# Patient Record
Sex: MALE | Race: WHITE | NOT HISPANIC OR LATINO | Employment: FULL TIME | URBAN - METROPOLITAN AREA
[De-identification: names, ages, dates, MRNs, and addresses within clinical notes are randomized per-mention and may not be internally consistent; named-entity substitution may affect disease eponyms.]

---

## 2017-12-20 ENCOUNTER — NON-PROVIDER VISIT (OUTPATIENT)
Dept: URGENT CARE | Facility: CLINIC | Age: 47
End: 2017-12-20

## 2017-12-20 ENCOUNTER — OFFICE VISIT (OUTPATIENT)
Dept: URGENT CARE | Facility: CLINIC | Age: 47
End: 2017-12-20

## 2017-12-20 DIAGNOSIS — Z01.89 RESPIRATORY CLEARANCE EXAMINATION, ENCOUNTER FOR: ICD-10-CM

## 2017-12-20 PROCEDURE — 8916 PR CLEARANCE ONLY: Performed by: NURSE PRACTITIONER

## 2017-12-20 PROCEDURE — 94375 RESPIRATORY FLOW VOLUME LOOP: CPT | Performed by: NURSE PRACTITIONER

## 2017-12-21 NOTE — PROGRESS NOTES
Corey Obregon is a 47 y.o. male here for a non-provider visit for Mask Fit/ Respiratory Clearance    If abnormal was an in office provider notified today (if so, indicate provider)? Yes  Routed to PCP? No

## 2018-07-13 ENCOUNTER — OFFICE VISIT (OUTPATIENT)
Dept: URGENT CARE | Facility: CLINIC | Age: 48
End: 2018-07-13
Payer: COMMERCIAL

## 2018-07-13 VITALS
BODY MASS INDEX: 29.77 KG/M2 | HEART RATE: 65 BPM | WEIGHT: 196.4 LBS | RESPIRATION RATE: 16 BRPM | TEMPERATURE: 97.8 F | SYSTOLIC BLOOD PRESSURE: 110 MMHG | HEIGHT: 68 IN | DIASTOLIC BLOOD PRESSURE: 86 MMHG | OXYGEN SATURATION: 95 %

## 2018-07-13 DIAGNOSIS — H10.13 ALLERGIC CONJUNCTIVITIS OF BOTH EYES: ICD-10-CM

## 2018-07-13 PROCEDURE — 99203 OFFICE O/P NEW LOW 30 MIN: CPT | Performed by: NURSE PRACTITIONER

## 2018-07-13 RX ORDER — OLOPATADINE HYDROCHLORIDE 1 MG/ML
1 SOLUTION/ DROPS OPHTHALMIC 2 TIMES DAILY
Qty: 5 ML | Refills: 0 | Status: SHIPPED | OUTPATIENT
Start: 2018-07-13 | End: 2018-07-13

## 2018-07-13 RX ORDER — PREDNISONE 10 MG/1
TABLET ORAL
Qty: 15 TAB | Refills: 0 | Status: SHIPPED | OUTPATIENT
Start: 2018-07-13 | End: 2018-09-02

## 2018-07-13 RX ORDER — LORATADINE 10 MG/1
10 TABLET ORAL DAILY
COMMUNITY

## 2018-07-13 ASSESSMENT — ENCOUNTER SYMPTOMS
RESPIRATORY NEGATIVE: 1
CONSTITUTIONAL NEGATIVE: 1
CARDIOVASCULAR NEGATIVE: 1
EYE REDNESS: 1
EYE DISCHARGE: 1

## 2018-07-13 NOTE — PROGRESS NOTES
Subjective:      Corey Obregon is a 47 y.o. male who presents with Eye Problem (bilateral eye redness/ irritation/ watery since yesterday )    History reviewed. No pertinent past medical history.  Social History     Social History   • Marital status: Single     Spouse name: N/A   • Number of children: N/A   • Years of education: N/A     Occupational History   • Not on file.     Social History Main Topics   • Smoking status: Never Smoker   • Smokeless tobacco: Never Used   • Alcohol use Not on file   • Drug use: Unknown   • Sexual activity: Not on file     Other Topics Concern   • Not on file     Social History Narrative   • No narrative on file     History reviewed. No pertinent family history.    Allergies: Patient has no known allergies.    Patient presents today with complaint of bilateral eye redness and itching with tearing. Symptoms started over the last week. States that he is working in a new area and thinks that he may be touching his face and this may be affecting his eyes. Patient has history of eczema and sensitivity to his skin. He currently has an outbreak of eczema to the posterior neck and the right antecubital area. He denies any upper respiratory symptoms.    Secondly, patient has itching behind his ears and has noted some cracking of the skin.          Eye Problem    Both eyes are affected.This is a new problem. The current episode started in the past 7 days. The problem occurs constantly. The problem has been unchanged. The pain is moderate. There is no known exposure to pink eye. He does not wear contacts. Associated symptoms include an eye discharge and eye redness. He has tried nothing for the symptoms. The treatment provided no relief.       Review of Systems   Constitutional: Negative.    HENT: Negative.    Eyes: Positive for discharge and redness.   Respiratory: Negative.    Cardiovascular: Negative.    Skin: Negative.    All other systems reviewed and are negative.         Objective:     BP  "110/86   Pulse 65   Temp 36.6 °C (97.8 °F)   Resp 16   Ht 1.727 m (5' 8\")   Wt 89.1 kg (196 lb 6.4 oz)   SpO2 95%   BMI 29.86 kg/m²      Physical Exam   Constitutional: He is oriented to person, place, and time. He appears well-developed and well-nourished.   HENT:   Right Ear: External ear normal.   Left Ear: External ear normal.   There is redness and irritation behind both ears, there are a few scattered fissures behind the ears.   Eyes: EOM are normal. Pupils are equal, round, and reactive to light.   There is tearing from both eyes, conjunctiva red and injected bilaterally. No purulent exudate. There is redness and inflammation to the upper and lower lids bilaterally.   Neck: Normal range of motion. Neck supple.   Cardiovascular: Normal rate and regular rhythm.    Pulmonary/Chest: Effort normal and breath sounds normal.   Neurological: He is alert and oriented to person, place, and time.   Skin: Skin is warm. Capillary refill takes less than 2 seconds.   Psychiatric: He has a normal mood and affect. His behavior is normal. Judgment and thought content normal.   Vitals reviewed.              Assessment/Plan:     1. Allergic conjunctivitis of both eyes  2. Seborrheic dermatitis  -Patanol gtts  -otc antihistamine of choice  -medrol dose nsehal  -neosporin applied behind the ears  -follow up if symptoms persist or worsen        "

## 2018-07-13 NOTE — LETTER
July 13, 2018         Patient: Corey Obregon   YOB: 1970   Date of Visit: 7/13/2018           To Whom it May Concern:    Corey Obregon was seen in my clinic on 7/13/2018. Please excuse him from work today 7/13/2018.    If you have any questions or concerns, please don't hesitate to call.        Sincerely,           Cathey J Hamman, A.P.N.  Electronically Signed

## 2018-09-02 ENCOUNTER — HOSPITAL ENCOUNTER (EMERGENCY)
Facility: MEDICAL CENTER | Age: 48
End: 2018-09-02
Attending: EMERGENCY MEDICINE
Payer: COMMERCIAL

## 2018-09-02 VITALS
OXYGEN SATURATION: 99 % | RESPIRATION RATE: 18 BRPM | HEART RATE: 98 BPM | SYSTOLIC BLOOD PRESSURE: 139 MMHG | HEIGHT: 68 IN | WEIGHT: 190 LBS | DIASTOLIC BLOOD PRESSURE: 76 MMHG | TEMPERATURE: 97.4 F | BODY MASS INDEX: 28.79 KG/M2

## 2018-09-02 DIAGNOSIS — F10.920 ALCOHOLIC INTOXICATION WITHOUT COMPLICATION (HCC): ICD-10-CM

## 2018-09-02 LAB
ALBUMIN SERPL BCP-MCNC: 4.5 G/DL (ref 3.2–4.9)
ALBUMIN/GLOB SERPL: 1.5 G/DL
ALP SERPL-CCNC: 56 U/L (ref 30–99)
ALT SERPL-CCNC: 29 U/L (ref 2–50)
AMPHET UR QL SCN: NEGATIVE
ANION GAP SERPL CALC-SCNC: 12 MMOL/L (ref 0–11.9)
APAP SERPL-MCNC: <10 UG/ML (ref 10–30)
AST SERPL-CCNC: 27 U/L (ref 12–45)
BARBITURATES UR QL SCN: NEGATIVE
BENZODIAZ UR QL SCN: POSITIVE
BILIRUB SERPL-MCNC: 0.3 MG/DL (ref 0.1–1.5)
BUN SERPL-MCNC: 12 MG/DL (ref 8–22)
BZE UR QL SCN: NEGATIVE
CALCIUM SERPL-MCNC: 9 MG/DL (ref 8.5–10.5)
CANNABINOIDS UR QL SCN: NEGATIVE
CHLORIDE SERPL-SCNC: 109 MMOL/L (ref 96–112)
CO2 SERPL-SCNC: 24 MMOL/L (ref 20–33)
CREAT SERPL-MCNC: 0.86 MG/DL (ref 0.5–1.4)
GLOBULIN SER CALC-MCNC: 3.1 G/DL (ref 1.9–3.5)
GLUCOSE SERPL-MCNC: 102 MG/DL (ref 65–99)
METHADONE UR QL SCN: NEGATIVE
OPIATES UR QL SCN: NEGATIVE
OXYCODONE UR QL SCN: NEGATIVE
PCP UR QL SCN: NEGATIVE
POC BREATHALIZER: 0.02 PERCENT (ref 0–0.01)
POTASSIUM SERPL-SCNC: 3.9 MMOL/L (ref 3.6–5.5)
PROPOXYPH UR QL SCN: NEGATIVE
PROT SERPL-MCNC: 7.6 G/DL (ref 6–8.2)
SALICYLATES SERPL-MCNC: 0 MG/DL (ref 15–25)
SODIUM SERPL-SCNC: 145 MMOL/L (ref 135–145)

## 2018-09-02 PROCEDURE — 80053 COMPREHEN METABOLIC PANEL: CPT

## 2018-09-02 PROCEDURE — 700102 HCHG RX REV CODE 250 W/ 637 OVERRIDE(OP): Performed by: EMERGENCY MEDICINE

## 2018-09-02 PROCEDURE — A9270 NON-COVERED ITEM OR SERVICE: HCPCS | Performed by: EMERGENCY MEDICINE

## 2018-09-02 PROCEDURE — 80307 DRUG TEST PRSMV CHEM ANLYZR: CPT

## 2018-09-02 PROCEDURE — 302970 POC BREATHALIZER: Performed by: EMERGENCY MEDICINE

## 2018-09-02 PROCEDURE — 99285 EMERGENCY DEPT VISIT HI MDM: CPT

## 2018-09-02 RX ORDER — NAPROXEN SODIUM 220 MG
220 TABLET ORAL DAILY
COMMUNITY

## 2018-09-02 RX ORDER — ACETAMINOPHEN 650 MG/1
650 SUPPOSITORY RECTAL EVERY 4 HOURS PRN
Status: DISCONTINUED | OUTPATIENT
Start: 2018-09-02 | End: 2018-09-02 | Stop reason: HOSPADM

## 2018-09-02 RX ORDER — ACETAMINOPHEN 325 MG/1
650 TABLET ORAL EVERY 4 HOURS PRN
Status: DISCONTINUED | OUTPATIENT
Start: 2018-09-02 | End: 2018-09-02 | Stop reason: HOSPADM

## 2018-09-02 RX ADMIN — ACETAMINOPHEN 650 MG: 325 TABLET, FILM COATED ORAL at 13:22

## 2018-09-02 ASSESSMENT — LIFESTYLE VARIABLES
HAVE PEOPLE ANNOYED YOU BY CRITICIZING YOUR DRINKING: NO
HAVE YOU EVER FELT YOU SHOULD CUT DOWN ON YOUR DRINKING: NO
TOTAL SCORE: 0
CONSUMPTION TOTAL: INCOMPLETE
DO YOU DRINK ALCOHOL: YES
EVER FELT BAD OR GUILTY ABOUT YOUR DRINKING: NO
TOTAL SCORE: 0
EVER HAD A DRINK FIRST THING IN THE MORNING TO STEADY YOUR NERVES TO GET RID OF A HANGOVER: NO
TOTAL SCORE: 0

## 2018-09-02 ASSESSMENT — PAIN SCALES - GENERAL
PAINLEVEL_OUTOF10: 0

## 2018-09-02 ASSESSMENT — PAIN SCALES - WONG BAKER: WONGBAKER_NUMERICALRESPONSE: DOESN'T HURT AT ALL

## 2018-09-02 NOTE — ED NOTES
"Pt took \"3\" xanex and drank some beer\".  Admits to SI, denies hi. A/v hallucinations.   Calm and cooperative, sleeping at this time on 2lnc..  PD placed on hold lost money gambling, told friend he wanted to kill himself, see L2K for info  "

## 2018-09-02 NOTE — ED NOTES
Patient is resting comfortably.  Asking to speak with MD, Dr. Rice notified.  Will seen life skills therapist soon.  Updated on POC.

## 2018-09-02 NOTE — ED NOTES
Spoke with pt, updated on POC and purpose of Legal 2000.  Pt still slightly sleepy from all the meds he took last night, but does comply with current plan of care.

## 2018-09-02 NOTE — DISCHARGE INSTRUCTIONS
Alcohol Problems  Most adults who drink alcohol drink in moderation (not a lot) are at low risk for developing problems related to their drinking. However, all drinkers, including low-risk drinkers, should know about the health risks connected with drinking alcohol.  RECOMMENDATIONS FOR LOW-RISK DRINKING   Drink in moderation. Moderate drinking is defined as follows:   · Men - no more than 2 drinks per day.  · Nonpregnant women - no more than 1 drink per day.  · Over age 65 - no more than 1 drink per day.  A standard drink is 12 grams of pure alcohol, which is equal to a 12 ounce bottle of beer or wine cooler, a 5 ounce glass of wine, or 1.5 ounces of distilled spirits (such as whiskey, darling, vodka, or rum).   ABSTAIN FROM (DO NOT DRINK) ALCOHOL:  · When pregnant or considering pregnancy.  · When taking a medication that interacts with alcohol.  · If you are alcohol dependent.  · A medical condition that prohibits drinking alcohol (such as ulcer, liver disease, or heart disease).  DISCUSS WITH YOUR CAREGIVER:  · If you are at risk for coronary heart disease, discuss the potential benefits and risks of alcohol use: Light to moderate drinking is associated with lower rates of coronary heart disease in certain populations (for example, men over age 45 and postmenopausal women). Infrequent or nondrinkers are advised not to begin light to moderate drinking to reduce the risk of coronary heart disease so as to avoid creating an alcohol-related problem. Similar protective effects can likely be gained through proper diet and exercise.  · Women and the elderly have smaller amounts of body water than men. As a result women and the elderly achieve a higher blood alcohol concentration after drinking the same amount of alcohol.  · Exposing a fetus to alcohol can cause a broad range of birth defects referred to as Fetal Alcohol Syndrome (FAS) or Alcohol-Related Birth Defects (ARBD). Although FAS/ARBD is connected with excessive  alcohol consumption during pregnancy, studies also have reported neurobehavioral problems in infants born to mothers reporting drinking an average of 1 drink per day during pregnancy.  · Heavier drinking (the consumption of more than 4 drinks per occasion by men and more than 3 drinks per occasion by women) impairs learning (cognitive) and psychomotor functions and increases the risk of alcohol-related problems, including accidents and injuries.  CAGE QUESTIONS:   · Have you ever felt that you should Cut down on your drinking?  · Have people Annoyed you by criticizing your drinking?  · Have you ever felt bad or Guilty about your drinking?  · Have you ever had a drink first thing in the morning to steady your nerves or get rid of a hangover (Eye opener)?  If you answered positively to any of these questions: You may be at risk for alcohol-related problems if alcohol consumption is:   · Men: Greater than 14 drinks per week or more than 4 drinks per occasion.  · Women: Greater than 7 drinks per week or more than 3 drinks per occasion.  Do you or your family have a medical history of alcohol-related problems, such as:  · Blackouts.  · Sexual dysfunction.  · Depression.  · Trauma.  · Liver dysfunction.  · Sleep disorders.  · Hypertension.  · Chronic abdominal pain.  · Has your drinking ever caused you problems, such as problems with your family, problems with your work (or school) performance, or accidents/injuries?  · Do you have a compulsion to drink or a preoccupation with drinking?  · Do you have poor control or are you unable to stop drinking once you have started?  · Do you have to drink to avoid withdrawal symptoms?  · Do you have problems with withdrawal such as tremors, nausea, sweats, or mood disturbances?  · Does it take more alcohol than in the past to get you high?  · Do you feel a strong urge to drink?  · Do you change your plans so that you can have a drink?  · Do you ever drink in the morning to relieve  the shakes or a hangover?  If you have answered a number of the previous questions positively, it may be time for you to talk to your caregivers, family, and friends and see if they think you have a problem. Alcoholism is a chemical dependency that keeps getting worse and will eventually destroy your health and relationships. Many alcoholics end up dead, impoverished, or in long-term. This is often the end result of all chemical dependency.  · Do not be discouraged if you are not ready to take action immediately.  · Decisions to change behavior often involve up and down desires to change and feeling like you cannot decide.  · Try to think more seriously about your drinking behavior.  · Think of the reasons to quit.  WHERE TO GO FOR ADDITIONAL INFORMATION   · The National Jean on Alcohol Abuse and Alcoholism (NIAAA)  www.niaaa.nih.gov  · National [place] on Alcoholism and Drug Dependence (NCADD)  www.ncadd.org  · American Society of Addiction Medicine (ASAM)  www.asam.org   Document Released: 12/18/2006 Document Revised: 03/11/2013 Document Reviewed: 08/05/2009  ExitCare® Patient Information ©2014 [employer].
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
## 2018-09-02 NOTE — ED PROVIDER NOTES
"ED Provider Note    CHIEF COMPLAINT  Chief Complaint   Patient presents with   • Suicidal Ideation   • Alcohol Intoxication       [facility]  Corey Obregon is a 47 y.o. male who presents for evaluation of possible suicidal ideation, alcohol intoxication and overdose. The patient was brought in on a legalhold apparently for calling a friend admitting that he was depressed, had several things go wrong in his life. He took some Xanax and potentially some other coingestions and the friend called the police. He denies any other ingestions besides Xanax and alcohol. No report of trauma  REVIEW OF SYSTEMS  See HPI for further details. No report of head injury loss of consciousness seizures All other systems are negative.     PAST MEDICAL HISTORY  No past medical history on file.    FAMILY HISTORY  Noncontributory    SOCIAL HISTORY  Social History     Social History   • Marital status: Single     Spouse name: N/A   • Number of children: N/A   • Years of education: N/A     Social History Main Topics   • Smoking status: Never Smoker   • Smokeless tobacco: Never Used   • Alcohol use Yes   • Drug use: Yes   • Sexual activity: Not on file     Other Topics Concern   • Not on file     Social History Narrative   • No narrative on file     History of polysubstance and alcohol abuse  SURGICAL HISTORY  No past surgical history on file.    CURRENT MEDICATIONS  Home Medications    **Home medications have not yet been reviewed for this encounter**         ALLERGIES  No Known Allergies    PHYSICAL EXAM  VITAL SIGNS: /70   Pulse 80   Temp 36.6 °C (97.8 °F)   Resp 16   Ht 1.727 m (5' 8\")   Wt 86.2 kg (190 lb)   SpO2 99%   BMI 28.89 kg/m²       Constitutional: Disheveled smells of alcohol  HENT: Normocephalic, Atraumatic, Bilateral external ears normal, Oropharynx moist, No oral exudates, Nose normal.   Eyes: PERRLA, EOMI, Conjunctiva normal, No discharge.   Neck: Normal range of motion, No tenderness, Supple, No stridor. . "   Cardiovascular: Normal heart rate, Normal rhythm, No murmurs, No rubs, No gallops.   Thorax & Lungs: Normal breath sounds, No respiratory distress, No wheezing, No chest tenderness.   Abdomen: Bowel sounds normal, Soft, No tenderness, No masses, No pulsatile masses.   Skin: Warm, Dry, No erythema, No rash.   Back: No tenderness, No CVA tenderness.   Extremities: Intact distal pulses, No edema, No tenderness, No cyanosis, No clubbing.   Neurologic: Slurred speech but arousable Alert & oriented x 3, Normal motor function, Normal sensory function, No focal deficits noted.   Psychiatric: Tearful admits to suicidal ideation       RADIOLOGY/PROCEDURES  Results for orders placed or performed during the hospital encounter of 09/02/18   URINE DRUG SCREEN   Result Value Ref Range    Amphetamines Urine Negative Negative    Barbiturates Negative Negative    Benzodiazepines Positive (A) Negative    Cocaine Metabolite Negative Negative    Methadone Negative Negative    Opiates Negative Negative    Oxycodone Negative Negative    Phencyclidine -Pcp Negative Negative    Propoxyphene Negative Negative    Cannabinoid Metab Negative Negative   COMP METABOLIC PANEL   Result Value Ref Range    Sodium 145 135 - 145 mmol/L    Potassium 3.9 3.6 - 5.5 mmol/L    Chloride 109 96 - 112 mmol/L    Co2 24 20 - 33 mmol/L    Anion Gap 12.0 (H) 0.0 - 11.9    Glucose 102 (H) 65 - 99 mg/dL    Bun 12 8 - 22 mg/dL    Creatinine 0.86 0.50 - 1.40 mg/dL    Calcium 9.0 8.5 - 10.5 mg/dL    AST(SGOT) 27 12 - 45 U/L    ALT(SGPT) 29 2 - 50 U/L    Alkaline Phosphatase 56 30 - 99 U/L    Total Bilirubin 0.3 0.1 - 1.5 mg/dL    Albumin 4.5 3.2 - 4.9 g/dL    Total Protein 7.6 6.0 - 8.2 g/dL    Globulin 3.1 1.9 - 3.5 g/dL    A-G Ratio 1.5 g/dL   SALICYLATE   Result Value Ref Range    Salicylates, Quant. 0 (L) 15 - 25 mg/dL   ACETAMINOPHEN   Result Value Ref Range    Acetaminophen -Tylenol <10 10 - 30 ug/mL   ESTIMATED GFR   Result Value Ref Range    GFR If   American >60 >60 mL/min/1.73 m 2    GFR If Non African American >60 >60 mL/min/1.73 m 2   POC BREATHALIZER   Result Value Ref Range    POC Breathalizer 0.02 (A) 0.00 - 0.01 Percent        COURSE & MEDICAL DECISION MAKING  Pertinent Labs & Imaging studies reviewed. (See chart for details)  Laboratory studies have been performed. The patient does not have clinical evidence of a coingestants or any specific intervention. He was progressively sobering up and of the time of this dictation life skills was still evaluating the patient. I trust their assessment is whether or not the patient will require a legalhold to be placed. The patient does not have any evidence of any significant toxidrome    FINAL IMPRESSION  1. Alcohol intoxication  2. Depression         Electronically signed by: Ronnie Schmidt, 9/2/2018 6:21 AM

## 2018-09-03 NOTE — DISCHARGE PLANNING
[facility]  Crisis/Safety Plan    Name:  Corey Obregon  MRN:  [id number]  Date:  2018    Warning signs that a crisis may be developing for me or I may be at risk:  1) I find myself very stressed after 4 12-hr. shifts.  2) At times when I see stress escalating, step back and not get so agitated.  3) Shoulders tighten up, and a little more intense.... I just had to walk away.    Coping strategies I can use on my own (relaxation, physical activity, etc):  1) At times I will take a Xanax or Lunesta to help sleep.  2) Take my dog Martin for more walks.  3) Take a break at work.    Ways I can make my environment safe:  1) There are no hazards I am aware of.  2) Find a less stressful occupation.  3)    Things I want to tell myself when I feel a crisis developin) Take a deep breath, there are worse things.  2) When a boss yells it is not about me--his own issues.  3)    People I can contact for support or distraction (and their phone numbers):  1) Mom  2) Kenisha  3)Brendan -- older friend who does not drink.    If I’m not able to reach my support people, or the above strategies don’t help, I can contact the following professionals, agencies, or hotlines:  1) Crisis Call Center ():  [phone] -OR- [phone]  2) Crisis Text Line ():  Text START to 081011  3) Attend an AA meeting.  4) Offer community service.    Fidel Handley, Ph.D.

## 2018-09-03 NOTE — DISCHARGE PLANNING
Alert Team Note: Consulted with Psychologist Fidel Handley who evaluated patient and determined patient is not a danger to himself or others at this time. In addition, Dr. Handley completed a Safety Plan with patient who was able to commit to maintaining is own safety at discharge.  Met with patient to assist in completing the discharge. Patient reports no suicidal ideations, plans to follow up with his employers [employer] for counseling support.   Consulted with Bedside RN and ERP for discharge.    Wen Suarez, Ph.D.  Alert Team Therapist

## 2018-09-03 NOTE — DISCHARGE PLANNING
Alert Team Therapist-Patient given additional resources for counseling follow up.    Wen Suarez, Ph.D.  Alert Team Therapist

## 2018-09-03 NOTE — ED PROVIDER NOTES
"7:07 PM  [facility] contacted me to advise me that the patient is now sober and denying suicidal ideation.      I reevaluated the patient.  He states that he used \"poor judgment\" and \"let off too much steam.\"  He demonstrates appropriate remorse and forward thinking.  I agree with discharging him.  I advised him to return to the ER for worsening symptoms.  "

## 2018-09-03 NOTE — ED NOTES
Pt given discharge instructions and given resources for counseling and psych help. All belongings were returned to the patient. Pt verbalized understanding. VSS. Pt ambulates with steady gait.

## 2024-11-22 ENCOUNTER — OFFICE VISIT (OUTPATIENT)
Dept: URGENT CARE | Facility: PHYSICIAN GROUP | Age: 54
End: 2024-11-22
Payer: MEDICAID

## 2024-11-22 VITALS
DIASTOLIC BLOOD PRESSURE: 78 MMHG | HEIGHT: 68 IN | OXYGEN SATURATION: 95 % | SYSTOLIC BLOOD PRESSURE: 128 MMHG | WEIGHT: 227.18 LBS | RESPIRATION RATE: 18 BRPM | TEMPERATURE: 97.5 F | HEART RATE: 81 BPM | BODY MASS INDEX: 34.43 KG/M2

## 2024-11-22 DIAGNOSIS — L50.9 HIVES: ICD-10-CM

## 2024-11-22 DIAGNOSIS — L30.9 ECZEMA, UNSPECIFIED TYPE: ICD-10-CM

## 2024-11-22 DIAGNOSIS — H66.001 ACUTE SUPPURATIVE OTITIS MEDIA OF RIGHT EAR WITHOUT SPONTANEOUS RUPTURE OF TYMPANIC MEMBRANE, RECURRENCE NOT SPECIFIED: ICD-10-CM

## 2024-11-22 PROCEDURE — 3078F DIAST BP <80 MM HG: CPT | Performed by: STUDENT IN AN ORGANIZED HEALTH CARE EDUCATION/TRAINING PROGRAM

## 2024-11-22 PROCEDURE — 99204 OFFICE O/P NEW MOD 45 MIN: CPT | Performed by: STUDENT IN AN ORGANIZED HEALTH CARE EDUCATION/TRAINING PROGRAM

## 2024-11-22 PROCEDURE — 3074F SYST BP LT 130 MM HG: CPT | Performed by: STUDENT IN AN ORGANIZED HEALTH CARE EDUCATION/TRAINING PROGRAM

## 2024-11-22 RX ORDER — TRIAMCINOLONE ACETONIDE 1 MG/G
1 CREAM TOPICAL 2 TIMES DAILY
Qty: 453 G | Refills: 0 | Status: SHIPPED | OUTPATIENT
Start: 2024-11-22

## 2024-11-22 RX ORDER — PIMECROLIMUS 10 MG/G
1 CREAM TOPICAL 2 TIMES DAILY
Qty: 30 G | Refills: 0 | Status: SHIPPED | OUTPATIENT
Start: 2024-11-22

## 2024-11-22 RX ORDER — METHYLPREDNISOLONE 4 MG/1
TABLET ORAL
Qty: 21 TABLET | Refills: 0 | Status: SHIPPED | OUTPATIENT
Start: 2024-11-22

## 2024-11-22 NOTE — PROGRESS NOTES
Subjective:   CHIEF COMPLAINT  Chief Complaint   Patient presents with    Tinea     States on back, thighs, legs X 2 weeks. Began on back of head. Spreading across the rest of his body. Itchiness, burning sensation    Otalgia     States has concerns if ear fluid that was secreting is associated with rash. Both ears. Also had scaly rash on it       HPI    History of Present Illness  Corey Obregon is a 54 y.o. male who presents for evaluation of a rash. He is accompanied by his wife.    Patient states he has been experiencing a pruritic rash for approximately 1.5 weeks ago.  Initially developed along the base of skull/neck and then moved to the anterior/posterior torso and bilateral upper and lower extremities. He has been using triamcinolone cream without relief. Bathing alleviates the itchiness, and he applies lotion afterwards.  Benadryl has not helped.  He has a history of dry skin and eczema. He does not endorse any recent use of antibiotics or new medications and does not have psoriasis.    He also mentions some ear discomfort, describing a sensation of fluid accumulation without pain. He has been experiencing ear drainage and popping sounds when lying down for a few months, but notes that it has started to dry up.    He has an allergy to sagebrush and has been in contact with his dog, which he recently bathed.    ALLERGIES  He is allergic to SAGEBRUSH.        REVIEW OF SYSTEMS  General: no fever or chills  GI: no nausea or vomiting  See HPI for further details.    PAST MEDICAL HISTORY  There are no active problems to display for this patient.      SURGICAL HISTORY  patient denies any surgical history    ALLERGIES  No Known Allergies    CURRENT MEDICATIONS  Home Medications       Reviewed by Conrad Lind D.O. (Physician) on 11/22/24 at 1411  Med List Status: <None>     Medication Last Dose Status   loratadine (CLARITIN) 10 MG Tab PRN Active   naproxen (ALEVE) 220 MG tablet PRN Active               "      SOCIAL HISTORY  Social History     Tobacco Use    Smoking status: Never    Smokeless tobacco: Never   Vaping Use    Vaping status: Never Used   Substance and Sexual Activity    Alcohol use: Yes     Comment: Rare    Drug use: Not Currently    Sexual activity: Not on file       FAMILY HISTORY  No family history on file.       Objective:   PHYSICAL EXAM  VITAL SIGNS: /78 (BP Location: Left arm, Patient Position: Sitting, BP Cuff Size: Adult long)   Pulse 81   Temp 36.4 °C (97.5 °F) (Temporal)   Resp 18   Ht 1.727 m (5' 8\")   Wt 103 kg (227 lb 2.9 oz)   SpO2 95%   BMI 34.54 kg/m²     Gen: no acute distress, normal voice  Skin: dry, intact, moist mucosal membranes.  Large hives along the anterior/posterior torso -localized more along the periphery.  Similar lesions along the bilateral thighs.  No excoriation.  Skin along the posterior neck and upper back was rather dry and scaly.  Eyes: No conjunctival injection b/l  Neck: Normal range of motion. No meningeal signs.   ENT: Suppurative effusion behind the right tympanic membrane with erythema of the middle ear.  Left tympanic membrane clear and intact without bulging, erythema or effusion.  Dry scaly skin within the external canals bilaterally.  Lungs: No increased work of breathing.  CTAB w/ symmetric expansion  CV: RRR w/o murmurs or clicks  Psych: normal affect, normal judgement, alert, awake    Assessment/Plan:     1. Acute suppurative otitis media of right ear without spontaneous rupture of tympanic membrane, recurrence not specified  amoxicillin-clavulanate (AUGMENTIN) 875-125 MG Tab      2. Hives  methylPREDNISolone (MEDROL DOSEPAK) 4 MG Tablet Therapy Pack    Referral to Dermatology      3. Eczema, unspecified type  pimecrolimus (ELIDEL) 1 % cream    triamcinolone acetonide (KENALOG) 0.1 % Cream    Referral to Dermatology      1) examination consistent with AOM.  -Ordered Augmentin    2) no obvious trigger but possibly related to sagebrush which " is a known allergen to the patient.  He was otherwise well-appearing without any respiratory distress.  -Ordered Medrol Dosepak.  Side effects discussed.  -Recommended Zyrtec  -Discontinue Benadryl    3) chronic condition and poorly controlled.  No secondary subcutaneous infection.  -Ordered Elidel for the face and behind the ears.  Okay to also put within the external canals  -Ordered topical triamcinolone cream and instructed to mix with a hydrating skin emollient  -Ordered referral to dermatology  -Return to urgent care any new/worsening symptoms or further questions or concerns.  Patient understood everything discussed.  All questions were answered.          Please note that this dictation was created using voice recognition software. I have made a reasonable attempt to correct obvious errors, but I expect that there are errors of grammar and possibly content that I did not discover before finalizing the note.

## 2024-12-13 ENCOUNTER — TELEPHONE (OUTPATIENT)
Dept: URGENT CARE | Facility: PHYSICIAN GROUP | Age: 54
End: 2024-12-13
Payer: MEDICAID

## 2025-01-09 ENCOUNTER — OFFICE VISIT (OUTPATIENT)
Dept: URGENT CARE | Facility: PHYSICIAN GROUP | Age: 55
End: 2025-01-09
Payer: MEDICAID

## 2025-01-09 VITALS
BODY MASS INDEX: 34.86 KG/M2 | SYSTOLIC BLOOD PRESSURE: 120 MMHG | HEIGHT: 68 IN | WEIGHT: 230 LBS | DIASTOLIC BLOOD PRESSURE: 92 MMHG | RESPIRATION RATE: 15 BRPM | OXYGEN SATURATION: 96 % | HEART RATE: 71 BPM | TEMPERATURE: 98.1 F

## 2025-01-09 DIAGNOSIS — L50.9 URTICARIA: ICD-10-CM

## 2025-01-09 DIAGNOSIS — H66.006 RECURRENT ACUTE SUPPURATIVE OTITIS MEDIA WITHOUT SPONTANEOUS RUPTURE OF TYMPANIC MEMBRANE OF BOTH SIDES: ICD-10-CM

## 2025-01-09 PROCEDURE — 99213 OFFICE O/P EST LOW 20 MIN: CPT | Performed by: FAMILY MEDICINE

## 2025-01-09 PROCEDURE — 3080F DIAST BP >= 90 MM HG: CPT | Performed by: FAMILY MEDICINE

## 2025-01-09 PROCEDURE — 3074F SYST BP LT 130 MM HG: CPT | Performed by: FAMILY MEDICINE

## 2025-01-09 RX ORDER — CEFDINIR 300 MG/1
300 CAPSULE ORAL 2 TIMES DAILY
Qty: 20 CAPSULE | Refills: 0 | Status: SHIPPED | OUTPATIENT
Start: 2025-01-09 | End: 2025-01-19

## 2025-01-09 RX ORDER — PREDNISONE 20 MG/1
20 TABLET ORAL DAILY
Qty: 5 TABLET | Refills: 0 | Status: SHIPPED | OUTPATIENT
Start: 2025-01-09 | End: 2025-01-14

## 2025-01-09 ASSESSMENT — ENCOUNTER SYMPTOMS: FEVER: 0

## 2025-01-09 NOTE — PROGRESS NOTES
"Subjective:     Corey Obregon is a 54 y.o. male who presents for Urticaria (Head, face, arms, legs, severely on chest, trying to find out what is causing hives, Hx of hives on 11/22/24. )    HPI  Pt presents for evaluation of an acute problem  Patient with rash on the extremities, chest, and head  Has had similar rash a few months ago, but unclear why  Has had no new soap, lotion, or foods  Thinks that it could be related to sawdust  Having no open lesions, bleeding, discharge    Also has bilateral ear pain  Recently diagnosed with otitis media and was treated with course of Augmentin  Pain was improving, but never fully resolved    Review of Systems   Constitutional:  Negative for fever.   HENT:  Positive for ear pain.        PMH:  has no past medical history on file.  MEDS:   Current Outpatient Medications:     predniSONE (DELTASONE) 20 MG Tab, Take 1 Tablet by mouth every day for 5 days., Disp: 5 Tablet, Rfl: 0    cefdinir (OMNICEF) 300 MG Cap, Take 1 Capsule by mouth 2 times a day for 10 days., Disp: 20 Capsule, Rfl: 0    pimecrolimus (ELIDEL) 1 % cream, Apply 1 Application topically 2 times a day. Apply to the face and behind the ears as needed, Disp: 30 g, Rfl: 0    triamcinolone acetonide (KENALOG) 0.1 % Cream, Apply 1 Application topically 2 times a day. Mix with a hydrating skin lotion.  Do not apply to genitals or the face., Disp: 453 g, Rfl: 0    naproxen (ALEVE) 220 MG tablet, Take 220 mg by mouth every day. Takes everyday with LORATADINE, Disp: , Rfl:   ALLERGIES: No Known Allergies  SURGHX: History reviewed. No pertinent surgical history.  SOCHX:  reports that he has never smoked. He has never used smokeless tobacco. He reports current alcohol use. He reports that he does not currently use drugs.     Objective:   BP (!) 120/92 (BP Location: Left arm, Patient Position: Sitting, BP Cuff Size: Adult)   Pulse 71   Temp 36.7 °C (98.1 °F) (Temporal)   Resp 15   Ht 1.727 m (5' 8\")   Wt 104 kg (230 lb) "   SpO2 96%   BMI 34.97 kg/m²     Physical Exam  Constitutional:       General: He is not in acute distress.     Appearance: He is well-developed. He is not diaphoretic.   HENT:      Head: Normocephalic and atraumatic.      Right Ear: Ear canal and external ear normal.      Left Ear: Ear canal and external ear normal.      Ears:      Comments: Bilateral tympanic membranes erythematous with large purulent effusions, no perforation appreciated.     Nose: Nose normal.   Pulmonary:      Effort: Pulmonary effort is normal.   Musculoskeletal:      Cervical back: Normal range of motion and neck supple. No tenderness.   Lymphadenopathy:      Cervical: No cervical adenopathy.   Skin:     Comments: Urticarial lesions throughout the chest, abdomen, and upper extremities.  No open lesions, no papular lesions   Neurological:      Mental Status: He is alert.       Assessment/Plan:   Assessment    1. Urticaria  - Referral to Allergy  - predniSONE (DELTASONE) 20 MG Tab; Take 1 Tablet by mouth every day for 5 days.  Dispense: 5 Tablet; Refill: 0    2. Recurrent acute suppurative otitis media without spontaneous rupture of tympanic membrane of both sides  - cefdinir (OMNICEF) 300 MG Cap; Take 1 Capsule by mouth 2 times a day for 10 days.  Dispense: 20 Capsule; Refill: 0    Patient with recurrent urticarial lesions.  Suspect that he has allergen causing this, but unclear exact cause.  Could be related to the cellulitis, but was not exposed to that the last time this happened.  Recommended doing further evaluation with allergist and referral made today.  Also noted to have bilateral otitis media.  No perforation appreciated.  Treat with cefdinir and follow-up in the urgent care as needed.